# Patient Record
Sex: FEMALE | Race: WHITE | NOT HISPANIC OR LATINO
[De-identification: names, ages, dates, MRNs, and addresses within clinical notes are randomized per-mention and may not be internally consistent; named-entity substitution may affect disease eponyms.]

---

## 2024-06-28 ENCOUNTER — NON-APPOINTMENT (OUTPATIENT)
Age: 69
End: 2024-06-28

## 2024-06-28 ENCOUNTER — APPOINTMENT (OUTPATIENT)
Dept: CARDIOLOGY | Facility: CLINIC | Age: 69
End: 2024-06-28
Payer: MEDICARE

## 2024-06-28 VITALS
DIASTOLIC BLOOD PRESSURE: 72 MMHG | SYSTOLIC BLOOD PRESSURE: 112 MMHG | BODY MASS INDEX: 34.87 KG/M2 | WEIGHT: 217 LBS | HEIGHT: 66 IN | OXYGEN SATURATION: 94 % | HEART RATE: 102 BPM

## 2024-06-28 VITALS — DIASTOLIC BLOOD PRESSURE: 76 MMHG | SYSTOLIC BLOOD PRESSURE: 114 MMHG

## 2024-06-28 DIAGNOSIS — Z78.9 OTHER SPECIFIED HEALTH STATUS: ICD-10-CM

## 2024-06-28 DIAGNOSIS — Z82.3 FAMILY HISTORY OF STROKE: ICD-10-CM

## 2024-06-28 DIAGNOSIS — Z87.891 PERSONAL HISTORY OF NICOTINE DEPENDENCE: ICD-10-CM

## 2024-06-28 DIAGNOSIS — M79.89 OTHER SPECIFIED SOFT TISSUE DISORDERS: ICD-10-CM

## 2024-06-28 DIAGNOSIS — E03.9 HYPOTHYROIDISM, UNSPECIFIED: ICD-10-CM

## 2024-06-28 DIAGNOSIS — Z82.0 FAMILY HISTORY OF EPILEPSY AND OTHER DISEASES OF THE NERVOUS SYSTEM: ICD-10-CM

## 2024-06-28 DIAGNOSIS — E78.00 PURE HYPERCHOLESTEROLEMIA, UNSPECIFIED: ICD-10-CM

## 2024-06-28 DIAGNOSIS — I10 ESSENTIAL (PRIMARY) HYPERTENSION: ICD-10-CM

## 2024-06-28 DIAGNOSIS — J43.9 EMPHYSEMA, UNSPECIFIED: ICD-10-CM

## 2024-06-28 DIAGNOSIS — Z13.6 ENCOUNTER FOR SCREENING FOR CARDIOVASCULAR DISORDERS: ICD-10-CM

## 2024-06-28 PROBLEM — Z00.00 ENCOUNTER FOR PREVENTIVE HEALTH EXAMINATION: Status: ACTIVE | Noted: 2024-06-28

## 2024-06-28 PROCEDURE — 93000 ELECTROCARDIOGRAM COMPLETE: CPT

## 2024-06-28 PROCEDURE — G2211 COMPLEX E/M VISIT ADD ON: CPT

## 2024-06-28 PROCEDURE — 99204 OFFICE O/P NEW MOD 45 MIN: CPT

## 2024-07-31 ENCOUNTER — APPOINTMENT (OUTPATIENT)
Dept: CARDIOLOGY | Facility: CLINIC | Age: 69
End: 2024-07-31
Payer: MEDICARE

## 2024-07-31 VITALS
OXYGEN SATURATION: 96 % | HEIGHT: 66 IN | DIASTOLIC BLOOD PRESSURE: 92 MMHG | BODY MASS INDEX: 34.87 KG/M2 | WEIGHT: 217 LBS | SYSTOLIC BLOOD PRESSURE: 150 MMHG | HEART RATE: 83 BPM

## 2024-07-31 DIAGNOSIS — M79.89 OTHER SPECIFIED SOFT TISSUE DISORDERS: ICD-10-CM

## 2024-07-31 DIAGNOSIS — E78.00 PURE HYPERCHOLESTEROLEMIA, UNSPECIFIED: ICD-10-CM

## 2024-07-31 DIAGNOSIS — Z13.6 ENCOUNTER FOR SCREENING FOR CARDIOVASCULAR DISORDERS: ICD-10-CM

## 2024-07-31 DIAGNOSIS — I10 ESSENTIAL (PRIMARY) HYPERTENSION: ICD-10-CM

## 2024-07-31 PROCEDURE — 93306 TTE W/DOPPLER COMPLETE: CPT

## 2024-07-31 PROCEDURE — 99214 OFFICE O/P EST MOD 30 MIN: CPT

## 2024-07-31 PROCEDURE — 93978 VASCULAR STUDY: CPT

## 2024-07-31 PROCEDURE — G2211 COMPLEX E/M VISIT ADD ON: CPT

## 2024-07-31 RX ORDER — AMLODIPINE BESYLATE 10 MG/1
10 TABLET ORAL DAILY
Qty: 90 | Refills: 3 | Status: ACTIVE | COMMUNITY
Start: 2024-07-31

## 2024-07-31 NOTE — DISCUSSION/SUMMARY
[FreeTextEntry1] : 1. Lower Extremity Edema: normal echocardiogram, 7/31/2024. Since seeing me last and stopping the amlodipine 10mg daily her lower extremity edema resolved, however, BP elevated now. She would like to return back on the amlodipine 10mg daily to see if her legs swell again.  2. HTN: Goal BP less than 130/80. Recommend low salt diet. Continue losartan/HCTZ 100/25mg daily. Resume amlodipine 10mg daily as above. Goal BP less than 130/80. Recommend low salt diet.  3. HLD: continue atorvastatin 20mg daily.  Follow up in 6 weeks.

## 2024-07-31 NOTE — CARDIOLOGY SUMMARY
[de-identified] : 6/28/2024, NSR with iRBBB, low voltages. [de-identified] : 7/31/2024, LV EF 60-65%, no significant valvular disease. [de-identified] : 7/31/2024, Abdominal Aortic Duplex: no AAA

## 2024-07-31 NOTE — PHYSICAL EXAM
[Normal] : moves all extremities, no focal deficits, normal speech [de-identified] : mild pitting edema bilateral lower extremities

## 2024-07-31 NOTE — HISTORY OF PRESENT ILLNESS
[FreeTextEntry1] : Historical Perspective: 68 year old female, former smoker (Quit 1/2024), HTN, HLD, COPD, hypothyroidism presents for a cardiac evaluation. Patient has had lower extremity edema for about 1 month. Legs feel mildly tight. Has been on amlodipine for quite some time. Patient has chronic dyspnea from COPD. No chest pain or pressure.  There is no history of MI, CVA, CHF, or previous coronary intervention.  Current Health Status: Since seeing me last and stopping the amlodipine 10mg daily her lower extremity edema resolved, however, BP elevated now. She would like to return back on the amlodipine 10mg daily to see if her legs swell again.

## 2024-09-11 ENCOUNTER — APPOINTMENT (OUTPATIENT)
Dept: CARDIOLOGY | Facility: CLINIC | Age: 69
End: 2024-09-11

## 2024-09-18 ENCOUNTER — APPOINTMENT (OUTPATIENT)
Dept: CARDIOLOGY | Facility: CLINIC | Age: 69
End: 2024-09-18
Payer: MEDICARE

## 2024-09-18 VITALS
BODY MASS INDEX: 32.3 KG/M2 | OXYGEN SATURATION: 98 % | HEIGHT: 66 IN | SYSTOLIC BLOOD PRESSURE: 110 MMHG | WEIGHT: 201 LBS | HEART RATE: 91 BPM | DIASTOLIC BLOOD PRESSURE: 64 MMHG

## 2024-09-18 DIAGNOSIS — Z13.6 ENCOUNTER FOR SCREENING FOR CARDIOVASCULAR DISORDERS: ICD-10-CM

## 2024-09-18 DIAGNOSIS — E78.00 PURE HYPERCHOLESTEROLEMIA, UNSPECIFIED: ICD-10-CM

## 2024-09-18 DIAGNOSIS — M79.89 OTHER SPECIFIED SOFT TISSUE DISORDERS: ICD-10-CM

## 2024-09-18 DIAGNOSIS — I10 ESSENTIAL (PRIMARY) HYPERTENSION: ICD-10-CM

## 2024-09-18 PROCEDURE — G2211 COMPLEX E/M VISIT ADD ON: CPT

## 2024-09-18 PROCEDURE — 99214 OFFICE O/P EST MOD 30 MIN: CPT

## 2024-09-18 NOTE — HISTORY OF PRESENT ILLNESS
[FreeTextEntry1] : Historical Perspective: 69 year old female, former smoker (Quit 1/2024), HTN, HLD, COPD, hypothyroidism presents for a cardiac evaluation. Patient has had lower extremity edema for about 1 month. Legs feel mildly tight. Has been on amlodipine for quite some time. Patient has chronic dyspnea from COPD. No chest pain or pressure.  There is no history of MI, CVA, CHF, or previous coronary intervention.  Current Health Status: Patient resumed the amlodipine 10mg daily her lower extremity edema resolved. Feeling well with no chest pain, dyspnea or palpitations.

## 2024-09-18 NOTE — PHYSICAL EXAM
[Normal] : moves all extremities, no focal deficits, normal speech [de-identified] : mild pitting edema bilateral lower extremities

## 2024-09-18 NOTE — DISCUSSION/SUMMARY
[FreeTextEntry1] : 1. Lower Extremity Edema: normal echocardiogram, 7/31/2024. Since seeing me last and stopping the amlodipine 10mg daily her lower extremity edema resolved, however, BP became elevated now. She wanted to return back on the amlodipine 10mg daily, which she has been taking and the LE edema hasn't returned.  2. HTN: Goal BP less than 130/80. Recommend low salt diet. Continue losartan/HCTZ 100/25mg daily. Resumed amlodipine 10mg daily as above. Goal BP less than 130/80. Recommend low salt diet.  3. HLD: continue atorvastatin 20mg daily.  Follow up in 12 months.

## 2024-09-18 NOTE — CARDIOLOGY SUMMARY
[de-identified] : 6/28/2024, NSR with iRBBB, low voltages. [de-identified] : 7/31/2024, LV EF 60-65%, no significant valvular disease. [de-identified] : 7/31/2024, Abdominal Aortic Duplex: no AAA

## 2025-09-15 ENCOUNTER — NON-APPOINTMENT (OUTPATIENT)
Age: 70
End: 2025-09-15

## 2025-09-17 ENCOUNTER — APPOINTMENT (OUTPATIENT)
Dept: CARDIOLOGY | Facility: CLINIC | Age: 70
End: 2025-09-17
Payer: MEDICARE

## 2025-09-17 VITALS
BODY MASS INDEX: 30.74 KG/M2 | HEART RATE: 108 BPM | DIASTOLIC BLOOD PRESSURE: 72 MMHG | WEIGHT: 191.25 LBS | OXYGEN SATURATION: 92 % | HEIGHT: 66 IN | SYSTOLIC BLOOD PRESSURE: 118 MMHG

## 2025-09-17 DIAGNOSIS — M79.89 OTHER SPECIFIED SOFT TISSUE DISORDERS: ICD-10-CM

## 2025-09-17 DIAGNOSIS — I10 ESSENTIAL (PRIMARY) HYPERTENSION: ICD-10-CM

## 2025-09-17 DIAGNOSIS — E78.00 PURE HYPERCHOLESTEROLEMIA, UNSPECIFIED: ICD-10-CM

## 2025-09-17 DIAGNOSIS — Z13.6 ENCOUNTER FOR SCREENING FOR CARDIOVASCULAR DISORDERS: ICD-10-CM

## 2025-09-17 PROCEDURE — 99214 OFFICE O/P EST MOD 30 MIN: CPT

## 2025-09-17 PROCEDURE — 93000 ELECTROCARDIOGRAM COMPLETE: CPT

## 2025-09-17 PROCEDURE — G2211 COMPLEX E/M VISIT ADD ON: CPT
